# Patient Record
Sex: MALE | Race: WHITE
[De-identification: names, ages, dates, MRNs, and addresses within clinical notes are randomized per-mention and may not be internally consistent; named-entity substitution may affect disease eponyms.]

---

## 2024-02-17 ENCOUNTER — HOSPITAL ENCOUNTER (EMERGENCY)
Dept: HOSPITAL 99 - EMR | Age: 24
End: 2024-02-17
Payer: COMMERCIAL

## 2024-02-17 VITALS — RESPIRATION RATE: 20 BRPM | SYSTOLIC BLOOD PRESSURE: 150 MMHG | DIASTOLIC BLOOD PRESSURE: 90 MMHG

## 2024-02-17 VITALS — BODY MASS INDEX: 33.1 KG/M2

## 2024-02-17 DIAGNOSIS — F41.9: ICD-10-CM

## 2024-02-17 DIAGNOSIS — F32.A: Primary | ICD-10-CM

## 2024-02-17 LAB
ALBUMIN SERPL-MCNC: 5 G/DL (ref 3.5–5)
ALP SERPL-CCNC: 87 U/L (ref 38–126)
ALT SERPL-CCNC: 42 U/L (ref 0–50)
APAP SERPL-MCNC: < 10 UG/ML (ref 10–30)
AST SERPL-CCNC: 30 U/L (ref 17–59)
BUN SERPL-MCNC: 13 MG/DL (ref 9–20)
CALCIUM SERPL-MCNC: 10 MG/DL (ref 8.4–10.2)
CHLORIDE SERPL-SCNC: 103 MMOL/L (ref 98–107)
CO2 SERPL-SCNC: 28 MMOL/L (ref 22–30)
EGFR: > 60
ERYTHROCYTE [DISTWIDTH] IN BLOOD BY AUTOMATED COUNT: 12 % (ref 11.5–14.5)
ESTIMATED CREATININE CLEARANCE: > 125 ML/MIN
GLUCOSE SERPL-MCNC: 105 MG/DL (ref 70–99)
HCT VFR BLD AUTO: 46.9 % (ref 39–52)
HGB BLD-MCNC: 16.7 G/DL (ref 13–18)
MCHC RBC AUTO-ENTMCNC: 35.6 G/DL (ref 33–37)
MCV RBC AUTO: 79.4 FL (ref 80–94)
NRBC BLD AUTO-RTO: 0 %
PLATELET # BLD AUTO: 330 10^3/UL (ref 130–400)
POTASSIUM SERPL-SCNC: 4.4 MMOL/L (ref 3.5–5.1)
PROT SERPL-MCNC: 8 G/DL (ref 6.3–8.2)
SALICYLATES SERPL-MCNC: < 1 MG/DL (ref 2–20)
SODIUM SERPL-SCNC: 138 MMOL/L (ref 135–145)

## 2024-02-17 PROCEDURE — 99285 EMERGENCY DEPT VISIT HI MDM: CPT

## 2024-03-19 ENCOUNTER — TELEPHONE (OUTPATIENT)
Dept: PSYCHIATRY | Facility: CLINIC | Age: 24
End: 2024-03-19

## 2024-03-27 ENCOUNTER — OFFICE VISIT (OUTPATIENT)
Dept: PSYCHIATRY | Facility: CLINIC | Age: 24
End: 2024-03-27
Payer: COMMERCIAL

## 2024-03-27 DIAGNOSIS — F33.0 MAJOR DEPRESSIVE DISORDER, RECURRENT, MILD (HCC): Primary | ICD-10-CM

## 2024-03-27 DIAGNOSIS — F41.9 ANXIETY DISORDER, UNSPECIFIED TYPE: ICD-10-CM

## 2024-03-27 PROCEDURE — 99204 OFFICE O/P NEW MOD 45 MIN: CPT | Performed by: PSYCHIATRY & NEUROLOGY

## 2024-03-27 RX ORDER — BUPROPION HYDROCHLORIDE 200 MG/1
200 TABLET, EXTENDED RELEASE ORAL DAILY
COMMUNITY
Start: 2024-02-24 | End: 2024-03-27

## 2024-03-27 RX ORDER — BUPROPION HYDROCHLORIDE 300 MG/1
TABLET ORAL
Qty: 30 TABLET | Refills: 1 | Status: SHIPPED | OUTPATIENT
Start: 2024-03-27

## 2024-03-27 RX ORDER — VENLAFAXINE HYDROCHLORIDE 75 MG/1
75 CAPSULE, EXTENDED RELEASE ORAL DAILY
COMMUNITY
Start: 2024-03-06 | End: 2024-03-27

## 2024-03-27 RX ORDER — VENLAFAXINE HYDROCHLORIDE 150 MG/1
150 CAPSULE, EXTENDED RELEASE ORAL DAILY
COMMUNITY
Start: 2024-03-18

## 2024-03-27 RX ORDER — ALBUTEROL SULFATE 90 UG/1
2 AEROSOL, METERED RESPIRATORY (INHALATION) EVERY 4 HOURS PRN
COMMUNITY
Start: 2024-02-01

## 2024-03-27 NOTE — PSYCH
[facility], [place]   Mental Health Outpatient clinic- Non Addiction  [address] [place] [address]   [phone]    Initial Psychiatric Evaluation  MRN#: [id number]   Gm Robertson 24 y.o. male.        This note was not shared with the patient due to reasonable likelihood of causing patient harm   __________________________________________________________________________________________________________________________________  OFFICE APPOINTMENT   Seen today at [facility] location                                               Patient Gm Robertson male  2000   Prescriber/Physician: Juan Ron DO ,she/her Physician Location:   [facility] OUTPATIENT  [address] [address]     This service was provided in the office.  Patient is currently located in the [place], where I am  licensed.   Patient gave consent to proceed with encounter; acknowledge understanding of security and privacy of encounter   Patient identity was verified as well as the [facility] chart  Patient verbalized understanding evaluation only involves Psychiatric diagnosing, prescribing, result monitoring   Patient was informed this is a billable service and legal  __________________________________________________________________________________________________________      Reason For Visit  Chief Complaint   Patient presents with   • Depression        Identifying Data:Gm Robertson , is a 24 y.o. ,Caucasianmale  who presents s/p IP ( 02/18- 3/05/2024 )and PHP [place] ( 03/06/204 -  3/26/2024) , for depression and anxiety. Discharged summary was reviewed. History of hospitalization in early February for Depression and anxiety in the mist of Klonopin withdrawal, was stepped down to PHP with findings of depressed mood, hoplessness, low concentration, weight changes , limited motivations) in the mist of also medicaitons adjustment, discotninueation of  "Prozac and stating Cymblata. Gm reported misuse of benzo's , would take Klonopin  3-4 x daily       HPI:  Gm Robertson  ( [facility]) reported recent loss of employment  in July 2023 as , and prior to  then he was unemployed x 3 yrs.. He reported afterwards attempting to find other work, but  instead he procrastinated and was not doing anything with his life. Then Gm started abusing his mom's Klonopin ,increased from 1/4 mg once wkly  toward 1/2 mg x 4 wkly and he acknowledged developing dependency. There's was also infrequent use and associated s/e described as panic attacks , intrusive negative thoughts, felt he was losing his mind, extreme anger. The wk prior to hospitalization Gm Robertson  stopped Klonopin and had strong withdrawal ,suicidal thoughts. Then was hospitalized after telling his mom who advised him to go there.       He now complains  of depression,  rated severity 5 to 6 out 10. There's associated symptoms lack of motivation , low energy, difficulty concentrating/limited thinking. There's also anxiety that he reasoned cause his stomach irritation. There's history of panic attacks. Although reports last had similar symptoms while hospitalized but thinks that was cause he was withdrawing from Klonopin. Gm Robertson reported thoughts \"thought he's  wasdying, fear something is wrong, impeded doom, hyperventilating, shaking intensely , teeth chattering. Episode  lasted 20mins and occurred after he woke up on the CRISIS unit and he was overwhelmed.    There's  history of  anxiety while 3rd to 4th grade that he described as stomach pain, anxiousness  if not completing homework. Gm would miss a lot of school.  Also then the dad had a affair. During highschool, he started to get depressed cause of difficulties adjusting , he had to transfer to new school, had limited relationship and home life stressors.        Medications:  Gm Robertson is currently prescribed  Bupropoin SR " "200mg , Venlafaxine 150mg; stated  Venlafaxine was increased 1 wk ago after PHP discharge. He  complained of limited improvement ,low energy and lack of motivation.           Psych ROS  Si/HI: without , last had SI prior to inpatient hospitalization   Irritability absent  Sleep Problems: he complained of midnight awakens, was told in PHP to get sleep study.  Appetite Gm Robertson stated as normal; he reported weight gain in the mist of not-active , \" not doing nothing   Psychosis: absent  Rachel:  Denies ,without high energy, impuslivity,  with mood changes   Anxiety:  defer to HPI  Depression: defer to HPI         Medical Review Of Systems:   Review of Systems   Constitutional:  Negative for chills, diaphoresis and fever.   HENT:  Negative for hearing loss and trouble swallowing.    Eyes:  Negative for photophobia and visual disturbance.   Respiratory:  Positive for wheezing. Negative for chest tightness and shortness of breath.         He has asthma   Cardiovascular:  Positive for palpitations. Negative for chest pain and leg swelling.        Heart palpitation due to anxiety ; although he denied increase heart rate   Gastrointestinal:  Negative for abdominal pain, constipation, diarrhea, nausea and vomiting.   Endocrine: Negative for cold intolerance and heat intolerance.   Genitourinary:  Negative for decreased urine volume, dysuria and frequency.   Musculoskeletal:  Negative for gait problem and joint swelling.        He complained of spams in  the legs    Skin:  Negative for rash and wound.   Allergic/Immunologic: Negative for environmental allergies.   Neurological:  Negative for dizziness, syncope and headaches.        Denies history of concussions, or head trauma   Psychiatric/Behavioral:  Positive for agitation and dysphoric mood. Negative for hallucinations.     Defer to HPI for other Pertinent items, all other 12 point symptoms are negative       Past Psychiatric History:    Inpatient Psychiatric " Treatment: x 1 [facility] defer to [facility]    Outpatient Treatment: [facility]  SIB/Suicide Attempts: denies   Access to Weapons:  denies  Violent Behavior: denies   Medication Trials:  Zoloft, Celexa 20mg - 40mgx 5 yrs (not working), Cymblata 90mg ( s/e tense ,shaking, pressure in the head, Wellbutrin ( non-compliance)       Not on File    NDKA  Seasonal Allergies( on Allergia)     Current Outpatient Medications on File Prior to Visit   Medication Sig Dispense Refill   • albuterol (PROVENTIL HFA,VENTOLIN HFA) 90 mcg/act inhaler Inhale 2 puffs every 4 (four) hours as needed     • buPROPion (WELLBUTRIN SR) 200 MG 12 hr tablet Take 200 mg by mouth daily     • fexofenadine (ALLEGRA ODT) 30 MG disintegrating tablet Take 30 mg by mouth daily He uncertain about the dose     • venlafaxine (EFFEXOR-XR) 150 mg 24 hr capsule Take 150 mg by mouth daily     • [DISCONTINUED] venlafaxine (EFFEXOR-XR) 75 mg 24 hr capsule Take 75 mg by mouth daily       No current facility-administered medications on file prior to visit.        Past Medical History:   Diagnosis Date   • Asthma           No past surgical history on file.     Tobacco Use: Medium Risk (3/27/2024)    Patient History    • Smoking Tobacco Use: Former    • Smokeless Tobacco Use: Unknown    • Passive Exposure: Not on file      Tobacco: Denies, reported only smoking fews x in his life   Illicit Drugs:  denies craving of Klonopin/benzo;  denies other illicit or prescribed drug abuse   ETOH: occassional, denies adddiction     FHX:  Mother : anxiety     Social History:    Education: 11th grade  Marital history: single  Children: zero  Living arrangement: Lives with his parents and 3 siblings   Occupational History:  [employer] , he was recently laid off   Functioning Relationships: He close to his mom          The following portions of the patient's history were reviewed and updated as appropriate: allergies, current medications, past family history, past medical history,  "past social history, past surgical history, and problem list.     _______________________________________________________________________________________      Mental status:        General Appearance:  Gm Robertson is a 24 y.o.  male casually dressed, looks stated age, Wearing glasses    Behavior:  cooperative, calm, intermittent eye gaze    Speech:  normal for rate, rhythm, volume, latency, amount   Mood:  depressed   Affect:  Limited range    Thought Process:  logical, perserverative, and tangential   Thought Content:  no overt delusions, normal, ruminations   Perceptual Disturbances: denies auditory hallucinations when asked, denies visual hallucinations when asked, Does not appear responding or preoccupied   Delusions  w/o   Risk Potential: Suicidal Ideations w/o  Homicidal Ideations w/o  Potential for Aggression w/o   Sensorium:  Oriented to person, place ( [facility]), time/date ( March 2024), and situation   Memory:  recent and remote memory grossly intact   Consciousness:  alert and awake   Attention: attention span and concentration are age appropriate   Insight:  Appropriate    Judgment: Appropriate    Gait/Station: normal   Motor Activity: no abnormal movements       There were no vitals filed for this visit.      Lab Results: I have personally reviewed all pertinent laboratory/tests results. Admission Labs:   No results found for any previous visit.     EKG No results found for: \"VENTRATE\", \"ATRIALRATE\", \"PRINT\", \"QRSDINT\", \"QTINT\", \"QTCINT\", \"PAXIS\", \"QRSAXIS\", \"TWAVEAXIS\"  Imaging Studies: No results found.     _______________________________________________________________________________________    A/P  Gm Robertson is a 24 y.o.  male, ,who presented s/p psychiatric  hospitalization , PHP for depression, anxiety and benzo abuse and missue in the setting of loss of employment.  Currently with findings of several neurovegetative symptoms , anxiety , otherwise devoid of SI " ,plan or intent, while on Venlafaxine increased 1 wk ago.  Hence patient was not hospitalized.        DSM5  1. Major depressive disorder, recurrent, mild (HCC)    2. Anxiety disorder, unspecified type          PLAN:  History and external records were reviewed. Discussed clinical findings and diagnotic impression: depression , mild anxiety  Discussed  increase of Bupropion, pt. Gm Robertson was accepting of plan  Did not change his other medications       Medications Prescribed During [facility] Encounter:  -     (Wellbutrin XL) 300 mg 24 hr tablet; Bupropion ER 300m tablet po in the morning    Medication List Also:  Venlafaxine 150mg      There are no discontinued medications.       Treatment Recommendations- Risks Benefits  Risks, Benefits And Possible Side Effects Of Medications:  Risks, benefits, and possible side effects of medications explained to patient and patient verbalizes understanding    Next  [facility] Appointment:    ______________________________________________________________________________________________________________________  Patient Encounter : 1:20PM -2:24 PM;Documentation Time:  5:22 PM- 5:48 PM    Encounter Duration: Time Spent  64 minutes  with Patient.Greater than 50% of total time was spent with the patient     MDM  Number of Diagnoses or Management Options  Diagnosis management comments: 2       Amount and/or Complexity of Data Reviewed  Review and summarize past medical records: yes    Risk of Complications, Morbidity, and/or Mortality  Presenting problems: moderate  Diagnostic procedures: moderate  Management options: moderate           This note may have been written with the assistance of dictation software. Please excuse any grammatical  errors, misspellings,  and abnormal spacing of letters , sentences or paragraphs . For accurate interpretation should read note horizontally

## 2024-03-27 NOTE — PATIENT INSTRUCTIONS
Gm Robertson [id number]   Thanks for presenting to today's Appointment at [facility]  Bupropion was increased to 300mg in the morning   Your other medications were not changed

## 2024-04-22 NOTE — PATIENT INSTRUCTIONS
Gm Robertson [id number]   Thanks for presenting to today's Appointment at [facility]  Your medications were not changed

## 2024-04-22 NOTE — PSYCH
[facility]   Mental Health Outpatient clinic- Non Addiction  [address] [place] [address]   [phone]     Psychiatric Progress Note   MRN#: [id number]   Gm Robertson 24 y.o. male.        This note was not shared with the patient due to reasonable likelihood of causing patient harm   __________________________________________________________________________________________________________________________________  OFFICE APPOINTMENT   Seen today at [facility] location                                               Patient Gm Robertson male  2000   Prescriber/Physician: Juan Ron DO ,she/her Physician Location:   [facility] OUTPATIENT  [address] [address]     This service was provided in the office.  Patient is currently located in the [place], where I am  licensed.   Patient gave consent to proceed with encounter; acknowledge understanding of security and privacy of encounter   Patient identity was verified as well as the [facility] chart  Patient verbalized understanding evaluation only involves Psychiatric diagnosing, prescribing, result monitoring   Patient was informed this is a billable service and legal  __________________________________________________________________________________________________________    Reason for Visit:  Chief Complaint   Patient presents with    Depression         Subjective:  Gm Robertson  increased Bupropion to 300mg , without s/e ; think medication is working more energetic and motivated, Although there lingering depression > anxiety , as Gm Robertson reported not happy with life trajectory , described not having nothing keep himself busy. He rated depression severity as  6/10 and anxiety  3/10    He was to stated activities of enjoying playing with his brother, since improved  weather outdoor and recent landscape    ROS  Si/HI: without  Sleep Problems:  without  Appetite Gm Robertson stated as normal  Anxiety: he described history of  nerves /anxiety with unknown or new events more so than new people; there some avoidance but intermittent able to go places and have to warm up to new event, There is pre thought of what if he does something wrong; there'a also associated symptoms of stomach irritation and  ; denies panic attacks, denies generalized worries   Tobacco- denies   Alcohol- denies        Medication: Gm Robertson is  compliant  Bupropion 300mg, Venlafaxine 150mg   Medication s/e: denies          Medical Review Of Systems:   Review of Systems   Gastrointestinal:  Negative for constipation, diarrhea, nausea and vomiting.   Neurological:  Negative for dizziness and headaches.    Defer to HPI for other Pertinent items, all other 12 point symptoms are negative       Current Outpatient Medications on File Prior to Visit   Medication Sig Dispense Refill    albuterol (PROVENTIL HFA,VENTOLIN HFA) 90 mcg/act inhaler Inhale 2 puffs every 4 (four) hours as needed      buPROPion (Wellbutrin XL) 300 mg 24 hr tablet Bupropion ER 300m tablet po in the morning 30 tablet 1    fexofenadine (ALLEGRA ODT) 30 MG disintegrating tablet Take 30 mg by mouth daily He uncertain about the dose      venlafaxine (EFFEXOR-XR) 150 mg 24 hr capsule Take 150 mg by mouth daily       No current facility-administered medications on file prior to visit.            The following portions of the patient's history were reviewed and updated as appropriate: allergies, current medications, past family history, past medical history, past social history, past surgical history, and problem list.     _______________________________________________________________________________________      Mental status:        General Appearance:  Gm Robertson is a 24 y.o.  male casually dressed, looks stated age, Wearing glasses    Behavior:  cooperative, guarded, intermittent eye gaze    Speech:  " normal for rate, rhythm, volume and amount with increased latency   Mood:  depressed > anxious   Affect:  Limited range    Thought Process:  normal and logical   Thought Content:  no overt delusions, normal   Perceptual Disturbances: denies auditory hallucinations when asked, denies visual hallucinations when asked, Does not appear responding or preoccupied   Delusions  w/o   Risk Potential: Suicidal Ideations w/o  Homicidal Ideations w/o  Potential for Aggression w/o   Sensorium:  Oriented to person, place ( [facility]), time/date ( April 2024), and situation   Memory:  recent and remote memory grossly intact   Consciousness:  alert and awake   Attention: attention span and concentration are  appropriate   Insight:  Appropriate    Judgment: Appropriate    Gait/Station: normal   Motor Activity: no abnormal movements       There were no vitals filed for this visit.      Lab Results: I have personally reviewed all pertinent laboratory/tests results. Admission Labs:   No results found for any previous visit.     EKG No results found for: \"VENTRATE\", \"ATRIALRATE\", \"PRINT\", \"QRSDINT\", \"QTINT\", \"QTCINT\", \"PAXIS\", \"QRSAXIS\", \"TWAVEAXIS\"  Imaging Studies: No results found.     _______________________________________________________________________________________    A/P  Gm Robertson is a 24 y.o.  male, ,who presented s/p psychiatric  hospitalization , PHP for depression, anxiety and benzo abuse and missue in the acute stressors.  Finding of several neurovegatative symptoms and anxiety. Currently with findings of persistent social anxiety , otherwise devoid of major depressive symptoms since higher dose Bupropion ~ 4 wk and Venlafaxine 150mg x 5wk.     DSM5  1. Recurrent Major depressive disorder, mild (HCC)    2. Social anxiety disorder           PLAN:  History and external records were reviewed. Discussed clinical findings and diagnotic impression: improving depression, lingering social anxiety  Did not " change medications   Pt Gm Robertson completed Treatment plan and he signed the form        Medications Prescribed During [facility] Encounter:  -     (Wellbutrin XL) 300 mg 24 hr tablet; Bupropion ER 300m tablet po in the morning    Medication List Also:  Venlafaxine 150mg      There are no discontinued medications.       Treatment Recommendations- Risks Benefits  Risks, Benefits And Possible Side Effects Of Medications:  Risks, benefits, and possible side effects of medications explained to patient and patient verbalizes understanding    [facility]    ______________________________________________________________________________________________________________________  Patient Encounter :   2:50  -3:16PM  ;Documenting:3:19- 3:24PM    Encounter Duration: Time Spent  24 minutes  with Patient.Greater than 50% of total time was spent with the patient     MDM  Number of Diagnoses or Management Options  Diagnosis management comments: 2       Amount and/or Complexity of Data Reviewed  Review and summarize past medical records: yes    Risk of Complications, Morbidity, and/or Mortality  Presenting problems: low  Diagnostic procedures: moderate  Management options: moderate           This note may have been written with the assistance of dictation software. Please excuse any grammatical  errors, misspellings,  and abnormal spacing of letters , sentences or paragraphs . For accurate interpretation should read note horizontally

## 2024-04-24 ENCOUNTER — OFFICE VISIT (OUTPATIENT)
Dept: PSYCHIATRY | Facility: CLINIC | Age: 24
End: 2024-04-24
Payer: COMMERCIAL

## 2024-04-24 DIAGNOSIS — F40.10 SOCIAL ANXIETY DISORDER: ICD-10-CM

## 2024-04-24 DIAGNOSIS — F33.0 MAJOR DEPRESSIVE DISORDER, RECURRENT, MILD (HCC): Primary | ICD-10-CM

## 2024-04-24 PROCEDURE — 99213 OFFICE O/P EST LOW 20 MIN: CPT | Performed by: PSYCHIATRY & NEUROLOGY

## 2024-04-24 RX ORDER — BUPROPION HYDROCHLORIDE 300 MG/1
TABLET ORAL
Qty: 30 TABLET | Refills: 1 | Status: SHIPPED | OUTPATIENT
Start: 2024-04-24

## 2024-04-24 RX ORDER — VENLAFAXINE HYDROCHLORIDE 150 MG/1
CAPSULE, EXTENDED RELEASE ORAL
Qty: 30 CAPSULE | Refills: 1 | Status: SHIPPED | OUTPATIENT
Start: 2024-04-24

## 2024-04-24 NOTE — BH TREATMENT PLAN
TREATMENT PLAN  [facility]    Name and Date of Birth:  Gm Robertson ,male, 24 y.o. 2000  Date of Treatment Plan: April 24, 2024  Diagnosis/Diagnoses:    1. Recurrent Major depressive disorder, mild (HCC)    2. Social anxiety disorder        Strengths/Personal Resources for Self-Care: Gm Robertson  supportive family, motivation for improvement   Area/Areas of need (in own words): He reported goals as less depression to improve his quality of life   1)  Long Term Goal:          lessen anxiety and depression  Reach Goal Date: 1 yr-2 yrs  Person/Persons responsible for completion of goal: Gm Robertson    2. Short Term Objective (s) - How to reach this goal?:           Recommended treatment:  Adherence to antidepressants due to diagnoses. Medication List: Bupropion 300mg and Venlafaxine 150mg          Routine [facility] appointments to monitor medication response           Routine monitoring of labs/vitals if necessary           CRISIS intervention/Acute Hospitalization if necessary   Reach Goal Date: 6 months- 1yr  Person/Persons Responsible for Completion of Goal: Gm Robertson    Progress Towards Goals: improving, improving gradually  Treatment Modality[facility]  Review due 180 days from date of this plan: 6 months - 10/24/2024  Expected length of service: 1-3yrs unless revised      This treatment plan was formulated via my Physician/ Psychiatrist and EDMOND Chaudhari I Gm Robertson (patient) , understand the goals and objectives listed  and agree to work on goals and objectives listed.     This note may have been written with the assistance of dictation software. Please excuse any grammatical  errors, misspellings,  and abnormal spacing of letters , sentences or paragraphs . For accurate interpretation should read note horizontally

## 2024-05-21 NOTE — PATIENT INSTRUCTIONS
Gm Robertson [id number]   Thanks for presenting to your [facility] appointment   Your medications were not changed

## 2024-05-21 NOTE — PSYCH
"   [facility]   Mental Health Outpatient clinic- Non Addiction  [address] [place] [address]   [phone]     Psychiatric Progress Note   MRN#: [id number]   Gm Robertson 24 y.o. male.        This note was not shared with the patient due to reasonable likelihood of causing patient harm   __________________________________________________________________________________________________________________________________  OFFICE APPOINTMENT   Seen today at [facility] location                                               Patient Gm Robertson male  2000   Prescriber/Physician: Juan Ron DO ,she/her Physician Location:   [facility] OUTPATIENT  [address] [address]     This service was provided in the office.  Patient is currently located in the [place], where I am  licensed.   Patient gave consent to proceed with encounter; acknowledge understanding of security and privacy of encounter   [facility]  Patient verbalized understanding evaluation only involves Psychiatric diagnosing, prescribing, result monitoring   Patient was informed this is a billable service and legal  __________________________________________________________________________________________________________    Reason for Visit:  Chief Complaint   Patient presents with   • Depression         Subjective:  Gm Robertson  medications are working but complained of lack of motivation, low energy, depressed mood; Precipitant factors reported as \" not working, he's looking for a job        ROS  Si/HI: without  Sleep Problems: without  Hopelessness/Guilty: Gm Robertson stated uncertainty about the future   Appetite Gm Robertson stated as normal  Anxiety:  w/o  Tobacco- denies          Medication: Gm Robertson is  compliant  Bupropion 300mg, Venlafaxine 150mg   Medication s/e: denies      "   Medical Review Of Systems:   Review of Systems   Gastrointestinal:  Negative for abdominal pain and constipation.   Neurological:  Negative for dizziness, light-headedness and headaches.    Defer to HPI for other Pertinent items, all other 12 point symptoms are negative       Current Outpatient Medications on File Prior to Visit   Medication Sig Dispense Refill   • albuterol (PROVENTIL HFA,VENTOLIN HFA) 90 mcg/act inhaler Inhale 2 puffs every 4 (four) hours as needed     • buPROPion (Wellbutrin XL) 300 mg 24 hr tablet Bupropion ER 300m tablet po in the morning 30 tablet 1   • fexofenadine (ALLEGRA ODT) 30 MG disintegrating tablet Take 30 mg by mouth daily He uncertain about the dose     • venlafaxine (EFFEXOR-XR) 150 mg 24 hr capsule Venlafaxine 150m capsule po in the morning 30 capsule 1     No current facility-administered medications on file prior to visit.            The following portions of the patient's history were reviewed and updated as appropriate: allergies, current medications, past family history, past medical history, past social history, past surgical history, and problem list.     _______________________________________________________________________________________      Mental status:        General Appearance:  Gm Robertson is a 24 y.o.  male casually dressed, looks stated age, Wearing glasses    Behavior:  very guarded, intermittent eye gaze    Speech:  normal for rate, rhythm, volume and amount with increased latency   Mood:  depressed    Affect:  Limited range    Thought Process:  goal directed and logical   Thought Content:  no overt delusions, normal   Perceptual Disturbances: Does not appear responding or preoccupied   Delusions  w/o   Risk Potential: Suicidal Ideations w/o  Homicidal Ideations w/o  Potential for Aggression w/o   Sensorium:  Oriented to person, place ( [facility]), time/date (May 2024), and situation   Memory:  recent and remote memory grossly  "intact   Consciousness:  alert and awake   Attention: attention span and concentration are  appropriate   Insight:  Appropriate    Judgment: Appropriate    Gait/Station: normal   Motor Activity: no abnormal movements       There were no vitals filed for this visit.      Lab Results: I have personally reviewed all pertinent laboratory/tests results. Admission Labs:   No results found for any previous visit.     EKG No results found for: \"VENTRATE\", \"ATRIALRATE\", \"PRINT\", \"QRSDINT\", \"QTINT\", \"QTCINT\", \"PAXIS\", \"QRSAXIS\", \"TWAVEAXIS\"  Imaging Studies: No results found.     _______________________________________________________________________________________    A/P  Gm Robertson is a 24 y.o.  male, ,who presented s/p psychiatric  hospitalization , PHP for depression, anxiety and benzo abuse and missue in the acute stressors , less depression and anxiety since higher dose Venlafaxine and Bupropion. Gm Robertson  currently has mild depressive symptoms ( amotivation, low energy, sadness) also presented with extreme guardedness impacting his ability to communication, likely cause of social anxiety     DSM5  1. Recurrent Major depressive disorder, mild (HCC)    2. Social anxiety disorder            PLAN:  History and external records were reviewed. Discussed clinical findings and diagnotic impression  Did not change medications   Pt Gm Robertson completed Treatment plan 24 ; also CRISIS plan 2024, he signed forms         Medications Prescribed During [facility] Encounter:    -     buPROPion (Wellbutrin XL) 300 mg 24 hr tablet; Bupropion ER 300m tablet po in the morning  -     venlafaxine (EFFEXOR-XR) 150 mg 24 hr capsule; Venlafaxine 150m capsule po in the mornin    [facility]:  -     Ambulatory referral to Psych Services;       There are no discontinued medications.       Treatment Recommendations- Risks Benefits  Risks, Benefits And Possible " Side Effects Of Medications:  Risks, benefits, and possible side effects of medications explained to patient and patient verbalizes understanding    Next  [facility] Appointment: 3 months    ______________________________________________________________________________________________________________________  Patient Encounter :   2:36PM -2:59PM                  ;Documenting: 3- 3:07PM    Encounter Duration: Time Spent  23 minutes  with Patient.Greater than 50% of total time was spent with the patient     MDM  Number of Diagnoses or Management Options  Diagnosis management comments: 2    Risk of Complications, Morbidity, and/or Mortality  Presenting problems: low  Diagnostic procedures: moderate  Management options: moderate           This note may have been written with the assistance of dictation software. Please excuse any grammatical  errors, misspellings,  and abnormal spacing of letters , sentences or paragraphs . For accurate interpretation should read note horizontally

## 2024-05-22 ENCOUNTER — OFFICE VISIT (OUTPATIENT)
Dept: PSYCHIATRY | Facility: CLINIC | Age: 24
End: 2024-05-22
Payer: COMMERCIAL

## 2024-05-22 ENCOUNTER — TELEPHONE (OUTPATIENT)
Dept: PSYCHIATRY | Facility: CLINIC | Age: 24
End: 2024-05-22

## 2024-05-22 DIAGNOSIS — F40.10 SOCIAL ANXIETY DISORDER: ICD-10-CM

## 2024-05-22 DIAGNOSIS — F33.0 MAJOR DEPRESSIVE DISORDER, RECURRENT, MILD (HCC): Primary | ICD-10-CM

## 2024-05-22 PROCEDURE — 99213 OFFICE O/P EST LOW 20 MIN: CPT | Performed by: PSYCHIATRY & NEUROLOGY

## 2024-05-22 RX ORDER — VENLAFAXINE HYDROCHLORIDE 150 MG/1
CAPSULE, EXTENDED RELEASE ORAL
Qty: 30 CAPSULE | Refills: 2 | Status: SHIPPED | OUTPATIENT
Start: 2024-05-22

## 2024-05-22 RX ORDER — BUPROPION HYDROCHLORIDE 300 MG/1
TABLET ORAL
Qty: 30 TABLET | Refills: 2 | Status: SHIPPED | OUTPATIENT
Start: 2024-05-22

## 2024-05-22 NOTE — TELEPHONE ENCOUNTER
Client had declined after visit summary at check out and writer later noticed a referral as part of the print out.     Writer alerted Dr. Ron's  and attempted to reach client to inquire as to whether he would want a physical copy and how he would like it delivered if so.     If client chooses to stay in [facility], [facility] facilities can access the form digitally.    Client's voicemail box does not work.     Writer will attempt to reach out again at a different time.

## 2024-05-22 NOTE — BH CRISIS PLAN
[facility] Mental Health Outpatient    Client Name: Gm Robertson, male      Client YOB: 2000     CRISIS PLAN Creation Date: 05/22/24 and CRISIS PLAN Review Date : 1 yr -5/22/2025  ____________________________________________________________________________________________________________  [facility] SAFETY PLAN      Step 1: Warning Signs:    Gm Robertson you stated:  Clonazepam withdrawal  Depression         Step 2: Internal Coping Strategies:   Gm Robertson you stated:  Not lingering with thoughts  Doing something to get your mind off it          Step 3: People and social settings that provide distraction:                    Names                                                      Contacts   Siblings ( 4)                                               Gm Robertson has access to number  Parents    Rocío Robertson  ( Gm mom)            [phone]                                                                                                                                                   Places:   Gm you stated your room and sitting in the backyard                                                                                          Step 4: People whom I can ask for help during a crisis:                              Name                                                                       Contact   Rocío Robertson                                              listed in step 3        Step 5: Professionals or agencies I can contact during a crisis:Ask: “What are the names of health care professionals, agencies, hospitals or other organizations that you can contact during a crisis and how will you contact them (include phone number or other way to contact them)?”    Clinician/Agency Name:                                         Phone                                               Emergency Contact    Patient did not identify any contacts                                                                                His Parents                 Local Emergency Department:                              Emergency Dept Phone                  Emergency Dept Address     Patient did not identify any contacts                                                                                                                               CRISIS PHONE NUMBERS   Suicide Prevention Lifeline: Call 2466-704-IUUR or Text  988 Crisis Text Line: Text HOME to 869-666   Please note: Some [facility] do not have a separate number for Child/Adolescent specific crisis. If your county is not listed under Child/Adolescent, please call the adult number for your county      Adult Crisis Numbers: Child/Adolescent Crisis Numbers   [facility]: [phone] [facility]: [phone]   [facility]: [phone] [facility]: [phone]   [facility]: [phone] [place]: [phone]   [facility]: [phone] [place] [facility]: [phone]   [place] [facility]: [phone]   [facility]: [phone]   [facility]: [phone]   [facility]: [phone] (daytime) [phone] (after hours, weekends, holidays)      Step 6: Making the environment safer (plan for lethal means safety):Ask: “For each lethal method that is identified, what is the specific plan to reduce access to this lethal method so that time will pass, your suicide feelings will diminish and it will be less likely that you will actually kill yourself? Who may assist you with this plan to make your environment safer?”    Patient did not identify any lethal methods              Optional:What is most important to me and worth living for?   Gm Robertson you stated : your family          Crys Safety Plan. Genevieve Hanley and Stanley Higginbotham ;  Use permission via   authors  ____________________________________________________________________________________________________________    This CRISIS plan was formulated via my Physician/ Psychiatrist and I . I,Gm Robertson patient ,   , understand and accept the CRISIS plan  developed for my treatment.     This note may have been written with the assistance of dictation software. Please excuse any grammatical  errors, misspellings,  and abnormal spacing of letters , sentences or paragraphs . For accurate interpretation should read note horizontally

## 2024-07-16 ENCOUNTER — TELEPHONE (OUTPATIENT)
Dept: PSYCHIATRY | Facility: CLINIC | Age: 24
End: 2024-07-16

## 2024-07-16 NOTE — TELEPHONE ENCOUNTER
Writer attempted to reach client to inform him of Dr. Ron's departure from [facility]. Client's phone does not have a working voicemail.

## 2024-08-21 ENCOUNTER — TELEPHONE (OUTPATIENT)
Dept: PSYCHIATRY | Facility: CLINIC | Age: 24
End: 2024-08-21

## 2024-08-21 NOTE — TELEPHONE ENCOUNTER
Attempted to call clt regarding [facility] apt, prior clt of Dr. Ron.  Unable to reach by phone, clt sent email to call office to schedule.

## 2024-08-28 DIAGNOSIS — F33.0 MAJOR DEPRESSIVE DISORDER, RECURRENT, MILD (HCC): ICD-10-CM

## 2024-08-28 DIAGNOSIS — F40.10 SOCIAL ANXIETY DISORDER: ICD-10-CM

## 2024-08-28 NOTE — TELEPHONE ENCOUNTER
Reason for call:   [x] Refill   [] Prior Auth  [x] Other: Pt stated that he scheduled an appt yesterday and he was supposed to get a courtesy refill until his appt next week.    Office:   [] PCP/Provider -   [x] Specialty/Provider - Juan Ron DO     Medication: (EFFEXOR-XR) 150 mg / 1 cap daily / 30 caps                      (Wellbutrin XL) 300 mg / 1 tab daily / 30 tabs        Pharmacy: [id number] - [place] [address]      Does the patient have enough for 3 days?   [] Yes   [x] No - Send as HP to POD

## 2024-08-29 RX ORDER — BUPROPION HYDROCHLORIDE 300 MG/1
TABLET ORAL
Qty: 30 TABLET | Refills: 0 | Status: SHIPPED | OUTPATIENT
Start: 2024-08-29

## 2024-08-29 RX ORDER — VENLAFAXINE HYDROCHLORIDE 150 MG/1
CAPSULE, EXTENDED RELEASE ORAL
Qty: 30 CAPSULE | Refills: 0 | Status: SHIPPED | OUTPATIENT
Start: 2024-08-29

## 2024-08-29 NOTE — TELEPHONE ENCOUNTER
"Called to review renewal, but got a message, \"not available,\" and not able to leave a message.   "

## 2024-09-03 NOTE — PSYCH
"Psychiatric Medication Management - Behavioral Health   Gm Robertson 24 y.o. male MRN: [id number]    Reason for Visit:   Chief Complaint   Patient presents with    Medication Management       Subjective:  Medication compliance: yes  Medication side effects: none     Gm is a 23 y/o male being seen for transfer of care appointment today, was previously seeing Dr. Ron, last appointment was on 5/22/2024. Patient has psychiatric diagnoses including major depressive disorder and anxiety, unspecified. Patient is currently being prescribed Wellbutrin  mg daily and Effexor  mg daily. No outpatient therapy or additional services in place at this time.    Patient presents today with \"calm\" mood. He states that he has been following up every 3 months and he has been on the same medications for a little while now. Patient reports that sleep has been okay, no issues falling asleep but states that he is waking up throughout the night. Patient averages between 6-8 hours each night. States that energy and motivation levels have been very low. Gm has been finding the motivation to apply to jobs to work as a . Appetite has been normal, eating 2-3 meals a day. Patient denies any significant aggression, mood swings, frequent crying spells, or elevated moods. He reports that he had some days of increased energy and motivation for about 1 week recently but nothing too extreme. Patient reports experiencing irritability almost every day but this is not too bothersome. Patient rates his depression a 5/10 on a severity scale today and he rates his anxiety a 1/10. Gm states that he has no major concerns at this time but he would like to talk about decreasing his medications. Patient states that he feels like the medications he is on at this time work for him but he missed a day and he felt okay being off of his medications. Patient states that when he started them again, he felt worse. Since this day, " patient has been cutting his pills in half or taking half doses of the capsules. Patient reports that he feels better on the smaller doses and he is feeling less irritable and less annoyed. Talked with patient a little bit about this and he states that he would feel comfortable decreasing his Wellbutrin to 150 mg daily and his Effexor to 75 mg daily. Patient denies any other major concerns at this time and he states that he would feel comfortable following up in about 3 months. Patient denies SI/HI. Denies access to guns or weapons. Denies AH/VH.    Review Of Systems:     Constitutional Negative   ENT Negative   Cardiovascular Negative   Respiratory Negative   Gastrointestinal Negative   Genitourinary Negative   Musculoskeletal Negative   Integumentary Negative   Neurological Negative   Endocrine Negative     Past Medical History:   Patient Active Problem List   Diagnosis    Recurrent Major depressive disorder, mild (HCC)    Social anxiety disorder       Allergies:   Allergies   Allergen Reactions    Peanut (Diagnostic) - Food Allergy Anaphylaxis       Past Surgical History: History reviewed. No pertinent surgical history.    Past Psychiatric History:   Inpatient Psychiatric Treatment: x 1 [facility] (February 2024)   Outpatient Treatment: [facility], past patient of Dr. Ron   SIB/Suicide Attempts: denies   Access to Weapons: denies  Violent Behavior: denies  Medication Trials:  Zoloft, Celexa 20mg - 40mgx 5 yrs (not working), Cymblata 90mg (s/e tense ,shaking, pressure in the head, Wellbutrin (non-compliance)     Family Psychiatric History:   Mother: anxiety     Social History:   Education: did not graduate high school   Marital history: single  Children: zero  Living arrangement: Lives with his parents and (4 siblings, 3 brothers and a sister)   Occupational History: Unemployed  Hobbies =  video games     Substance Abuse History:    Tobacco: Denies, reported only smoking fews x in his life   Illicit  "Drugs: denies craving of Klonopin/benzo;  denies other illicit or prescribed drug abuse   ETOH: occassional, denies adddiction     Traumatic History:    Denies history of physical, verbal, sexual, and emotional abuse.  Traumatic events: 14 y/o - mom found out dad was cheating on her and he was drinking a lot     The following portions of the patient's history were reviewed and updated as appropriate: allergies, current medications, past family history, past medical history, past social history, past surgical history, and problem list.    Objective:  There were no vitals filed for this visit.      Weight (last 2 days)       None          Labs: N/A    Mental status:  Appearance sitting comfortably in chair, dressed in casual clothing, adequate hygiene and grooming, cooperative with interview, fair eye contact   Mood \"Calm\"    Affect Appears mildly constricted in depressed range, stable, mood-congruent   Speech Normal rate, rhythm, and volume   Thought Processes Linear and goal directed   Associations intact associations   Hallucinations Denies any auditory or visual hallucinations   Thought Content No passive or active suicidal or homicidal ideation, intent, or plan.   Orientation Oriented to person, place, time, and situation   Recent and Remote Memory Grossly intact   Attention Span and Concentration Concentration intact   Intellect Appears to be of Average Intelligence   Insight Insight intact   Judgement judgment was intact   Muscle Strength Muscle strength and tone were normal   Language Within normal limits   Fund of Knowledge Age appropriate   Pain None     PHQ-A Depression Screening    Feeling down, depressed, irritable or hopeless: 2 - more than half the days  Little interest or pleasure in doing things: 2 - more than half the days  Trouble falling or staying asleep, or sleeping too much: 2 - more than half the days  Poor appetite or overeatin - not at all  Feeling tired or having little energy: 3 - " nearly every day  Feeling bad about yourself - or that you are a failure or have let yourself or your family down: 1 - several days  Trouble concentrating on things, such as reading the newspaper or watching television: 0 - not at all  Moving or speaking so slowly that other people could have noticed. Or the opposite - being so fidgety or restless that you have been moving around a lot more than usual: 0 - not at all  Thoughts that you would be better off dead, or of hurting yourself in some way: 0 - not at all          KRIS-7 Flowsheet Screening      Flowsheet Row Most Recent Value   Over the last two weeks, how often have you been bothered by the following problems?     Feeling nervous, anxious, or on edge 0   Not being able to stop or control worrying 1   Worrying too much about different things 1   Trouble relaxing  1   Being so restless that it's hard to sit still 0   Becoming easily annoyed or irritable  3   Feeling afraid as if something awful might happen 0   How difficult have these problems made it for you to do your work, take care of things at home, or get along with other people?  Very difficult   KRIS Score  6             Assessment/Plan:     Impression:  Major depressive disorder - generally stable    Anxiety disorder, unspecified - generally stable      Decrease to Wellbutrin  mg in the morning to help continue to with anxiety and depression at the lowest dose possible  Decrease to Effexor XR 75 mg by mouth in the morning to continue to help with anxiety and depression at the lowest dose possible  Recommended outpatient therapy, patient not interested at this time   Medical follow up with PCP as needed  Follow up in 3 months      Diagnoses:   Diagnoses and all orders for this visit:    Recurrent Major depressive disorder, mild (HCC)  -     buPROPion (Wellbutrin XL) 150 mg 24 hr tablet; Take 1 tablet (150 mg total) by mouth daily  -     venlafaxine (EFFEXOR-XR) 75 mg 24 hr capsule; Take 1 capsule  (75 mg total) by mouth daily    Social anxiety disorder  -     buPROPion (Wellbutrin XL) 150 mg 24 hr tablet; Take 1 tablet (150 mg total) by mouth daily  -     venlafaxine (EFFEXOR-XR) 75 mg 24 hr capsule; Take 1 capsule (75 mg total) by mouth daily        Treatment Recommendations:      Risks, Benefits And Possible Side Effects Of Medications:  Risks, benefits, and possible side effects of medications explained to patient and family, they verbalize understanding    Controlled Medication Discussion:  Not applicable - controlled substances not prescribed by this practice.      Treatment Plan: Both a treatment plan and a crisis plan were completed at today's visit. Patient verbally consented and signed both forms while in the office today. Next treatment plan due 3/5/2025.     Visit Time    Visit Start Time: 1000  Visit Stop Time: 1028  Total Visit Duration:  28 minutes      Qing Power PA-C  09/05/24

## 2024-09-05 ENCOUNTER — OFFICE VISIT (OUTPATIENT)
Dept: PSYCHIATRY | Facility: CLINIC | Age: 24
End: 2024-09-05
Payer: COMMERCIAL

## 2024-09-05 DIAGNOSIS — F33.0 MAJOR DEPRESSIVE DISORDER, RECURRENT, MILD (HCC): Primary | ICD-10-CM

## 2024-09-05 DIAGNOSIS — F40.10 SOCIAL ANXIETY DISORDER: ICD-10-CM

## 2024-09-05 PROCEDURE — 99214 OFFICE O/P EST MOD 30 MIN: CPT

## 2024-09-05 RX ORDER — BUPROPION HYDROCHLORIDE 150 MG/1
150 TABLET ORAL DAILY
Qty: 30 TABLET | Refills: 2 | Status: SHIPPED | OUTPATIENT
Start: 2024-09-05

## 2024-09-05 RX ORDER — VENLAFAXINE HYDROCHLORIDE 75 MG/1
75 CAPSULE, EXTENDED RELEASE ORAL DAILY
Qty: 30 CAPSULE | Refills: 2 | Status: SHIPPED | OUTPATIENT
Start: 2024-09-05

## 2024-09-05 NOTE — BH CRISIS PLAN
Client Name: Gm Robertson       Client YOB: 2000    [place] Safety Plan      Creation Date: 9/5/24 Update Date: 9/5/25   Created By: Qing Power PA-C Last Updated By: Qing Power PA-C      Step 1: Warning Signs:   Warning Signs   Isolation   Negative thinking            Step 2: Internal Coping Strategies:   Internal Coping Strategies   Go outside and maybe go swimming   Video games   Play with dogs            Step 3: People and social settings that provide distraction:   Name Contact Information   Mom number in cell phone   Sister (Samira) number in cell phone            Step 4: People whom I can ask for help during a crisis:      Name Contact Information    Mom number in cell phone    Sister (Samira) number in cell phone      Step 5: Professionals or agencies I can contact during a crisis:      Clinican/Agency Name Phone Emergency Contact    Qing Power PA-C [phone]         Crisis Phone Numbers:   Suicide Prevention Lifeline: Call or Text  988 Crisis Text Line: Text HOME to 237-263   Please note: Some [facility] do not have a separate number for Child/Adolescent specific crisis. If your county is not listed under Child/Adolescent, please call the adult number for your county      Adult Crisis Numbers: Child/Adolescent Crisis Numbers   [facility]: [phone] [facility]: [phone]   [facility]: [phone] [facility]: [phone]   [facility]: [phone] [place]: [phone]   [facility]: [phone] [place]: [phone]   [facility]: [phone]   [facility]: [phone]   [facility]: [phone]   [facility]: [phone] (daytime) [phone] (after hours, weekends, holidays)      Step 6: Making the environment safer (plan for lethal means safety):   Plan: No firearms in the house      Optional: What is most important to me and worth living for?       [facility]rys Safety Plan. Genevieve Hanley and Stanley Higginbotham. Used with permission of the authors.

## 2024-09-05 NOTE — BH TREATMENT PLAN
TREATMENT PLAN (Medication Management Only)        [facility]    Name and Date of Birth:  Gm Robertson 24 y.o. 2000  Date of Treatment Plan: September 5, 2024  Diagnosis/Diagnoses:    1. Recurrent Major depressive disorder, mild (HCC)    2. Social anxiety disorder      Strengths/Personal Resources for Self-Care: supportive family, taking medications as prescribed.  Area/Areas of need (in own words): anxiety, depression  1. Long Term Goal: improve mood.  Target Date:6 months - 3/5/2025  Person/Persons responsible for completion of goal: Gm  2.  Short Term Objective (s) - How will we reach this goal?:   A. Provider new recommended medication/dosage changes and/or continue medication(s): continue current medications as prescribed.  B. Attend medication management appointments regularly.  C. N/A.  Target Date:6 months - 3/5/2025  Person/Persons Responsible for Completion of Goal: Gm  Progress Towards Goals: stable  Treatment Modality: medication management every 3 months  Review due 180 days from date of this plan: 6 months - 3/5/2025  Expected length of service: maintenance  My Physician/PA/NP and I have developed this plan together and I agree to work on the goals and objectives. I understand the treatment goals that were developed for my treatment.

## 2024-09-12 ENCOUNTER — TELEPHONE (OUTPATIENT)
Age: 24
End: 2024-09-12

## 2024-09-12 NOTE — TELEPHONE ENCOUNTER
"The writer attemped to contact the patient off the TT wait list to offer a potential appt. The writer could not leave a voicemail due to \" the wireless customer you are trying to contact is not available, please try again later.\"    1st attempt   "

## 2024-11-01 ENCOUNTER — TELEPHONE (OUTPATIENT)
Dept: PSYCHIATRY | Facility: CLINIC | Age: 24
End: 2024-11-01

## 2024-11-01 NOTE — TELEPHONE ENCOUNTER
Attempted to call client regarding scheduling for therapy.  Clts phone not excepting calls at this time.

## 2025-03-13 ENCOUNTER — TELEPHONE (OUTPATIENT)
Dept: PSYCHIATRY | Facility: CLINIC | Age: 25
End: 2025-03-13

## 2025-03-13 ENCOUNTER — DOCUMENTATION (OUTPATIENT)
Dept: PSYCHIATRY | Facility: CLINIC | Age: 25
End: 2025-03-13

## 2025-03-13 DIAGNOSIS — F33.0 MAJOR DEPRESSIVE DISORDER, RECURRENT, MILD (HCC): Primary | ICD-10-CM

## 2025-03-13 NOTE — PSYCH
"Psychiatric Discharge Summary    Patient name: Gm Robertson   : 2000  Admit Date: 2024  Discharge Date: 3/13/2025    Discharge Diagnosis: Major depressive disorder and anxiety disorder, unspecified    Discharge Prognosis: Good    Presenting Problems/Pertinent Findings: Anxiety, depression    Course of Treatment:  Patient was in treatment with this provider for transfer of care and medication management. Patient was only seen by this provider for 1 visit where he wished to discuss decreasing his medications. Patient had already decreased his medications on his own so he was continued at the lower doses of both Wellbutrin and Effexor. He denied having any major concerns at the time of his appointment and was scheduled for follow-up in 3 months. Patient canceled this appointment and did not respond to any out reach attempts made by the office. Therefore, patient was discharged from this provider.      Criteria for Discharge: Have demostrated failure to uphold their treatment plan/contract    Aftercare Recommendations: Follow up with pcp    Discharge Medications:   Current Outpatient Medications:     albuterol (PROVENTIL HFA,VENTOLIN HFA) 90 mcg/act inhaler, Inhale 2 puffs every 4 (four) hours as needed, Disp: , Rfl:     buPROPion (Wellbutrin XL) 150 mg 24 hr tablet, Take 1 tablet (150 mg total) by mouth daily, Disp: 30 tablet, Rfl: 2    fexofenadine (ALLEGRA ODT) 30 MG disintegrating tablet, Take 30 mg by mouth daily He uncertain about the dose, Disp: , Rfl:     venlafaxine (EFFEXOR-XR) 75 mg 24 hr capsule, Take 1 capsule (75 mg total) by mouth daily, Disp: 30 capsule, Rfl: 2     Mental Status at Time of most recent visit on 2024: Patient appeared sitting comfortably in chair, dressed in casual clothing, and displayed adequate hygiene and grooming. Patient was cooperative with interview and maintained fair eye contact. He reported that his mood was \"calm\". Patient appeared mildly constricted in " depressed range but was overall stable. Speech was normal rate, rhythm, and volume. Thought processes were linear and goal-directed. Associations were intact. Patient denied SI/HI/AH/VH. Recent and remote memory were grossly intact. Patient was oriented to person, place, time, and situation. Attention span and concentration were intact. Patient appeared to be of average intelligence although not formally assessed. Insight and judgment were intact.    Qing Power PA-C  03/13/25